# Patient Record
(demographics unavailable — no encounter records)

---

## 2025-05-05 NOTE — PHYSICAL EXAM
[de-identified] : General: Patient appears well nourished without any distress  HEENT: Thyroid is supple, no nodules palpated, no LAD   [TextEntry] : General: Patient appears well nourished without any distress  HEENT: Thyroid is supple, no nodules palpated, no LAD  Cardio: RRR, no murmurs appreciated  Pulm: CTA b/l, no wheezes, no edema  Skin: No significant rashes or bruises noted Neuro: No focal deficits noted. No tremors GI: No pain with palpation

## 2025-05-05 NOTE — HISTORY OF PRESENT ILLNESS
[FreeTextEntry1] : 20 year old with Graves' disease, currently in remission, presents for follow up  Interim history Feels good. Denies any complaints In college studying    #Graves' disease -Diagnosed December 2017 (typical symptoms significant weight loss, fine tremor) -Medications methimazole (treatment for Jan 2018 -> stopped sep 2020) -> negative TSI/Trab, in remission -Never had HOANG- although chart from pediatrics does mention that patient may have had it  -Non smoker -No ETOH  Denies any symptoms of overt hyperthyroidism  #Familial neutropenia -Mild -Seen by heme - Advised to re-refer if ANC <700 (0.7) on 2x separate occasions  Medications -Vit D 2000IU 3-4x per week -Fish oil

## 2025-05-05 NOTE — PHYSICAL EXAM
[de-identified] : General: Patient appears well nourished without any distress  HEENT: Thyroid is supple, no nodules palpated, no LAD   [TextEntry] : General: Patient appears well nourished without any distress  HEENT: Thyroid is supple, no nodules palpated, no LAD  Cardio: RRR, no murmurs appreciated  Pulm: CTA b/l, no wheezes, no edema  Skin: No significant rashes or bruises noted Neuro: No focal deficits noted. No tremors GI: No pain with palpation

## 2025-05-05 NOTE — REVIEW OF SYSTEMS
[FreeTextEntry1] : As per HPI [TextEntry] : Constitutional: Denies any fatigue, recent weight change, change in appetite Eyes: Denies any blurry vision, visual field defect, eye pain.  ENT: Denies any dysphagia, neck pain, hearing loss, nasal congestion  Resp: Denies any SOB, cough, wheezing  CV: Denies any chest pain, palpitations, edema  GI: Denies any nausea, vomiting, diarrhea or constipation Endo: Denies any polyuria, polydipsia, heat or cold intolerance MSK: Denies any joint pain, muscle weakness or joint stiffness Neuro: Denies any headaches, dizziness, tremors, pain/numbness Psych: Denies any anxiety, insomnia, depression, suicidal ideation

## 2025-05-05 NOTE — ASSESSMENT
[FreeTextEntry1] : 20 year old with Graves' disease, currently in remission, presents for follow up   #Graves' disease -Currently in remission since 2020 -Last labs were done just this week WNL -Patient aware of symptoms of hyperthyroidism and aware to call and repeat labs sooner Plan: - Check TFTs   #Familial neutropenia -Seen by juan r - Advised to re-refer if ANC <700 (0.7) on 2x separate occasions -May worsen with methimazole treatment -> need to consider definitive therapy if relapse  #Vit D deficiency -Pt taking 2000IU 3-4x per week - check vitamin D level   SUKUMAR appt- 40 min appt- patient is new to me. previously seen by Dr. Ramirez  RTC in 1 year